# Patient Record
Sex: FEMALE | Race: WHITE | URBAN - METROPOLITAN AREA
[De-identification: names, ages, dates, MRNs, and addresses within clinical notes are randomized per-mention and may not be internally consistent; named-entity substitution may affect disease eponyms.]

---

## 2020-09-09 ENCOUNTER — OFFICE VISIT (OUTPATIENT)
Dept: URBAN - METROPOLITAN AREA CLINIC 45 | Facility: CLINIC | Age: 45
End: 2020-09-09
Payer: COMMERCIAL

## 2020-09-09 DIAGNOSIS — H00.011 HORDEOLUM EXTERNUM RIGHT UPPER EYELID: Primary | ICD-10-CM

## 2020-09-09 PROCEDURE — 92002 INTRM OPH EXAM NEW PATIENT: CPT | Performed by: OPTOMETRIST

## 2020-09-09 RX ORDER — NEOMYCIN SULFATE, POLYMYXIN B SULFATE AND DEXAMETHASONE 3.5; 10000; 1 MG/G; [USP'U]/G; MG/G
OINTMENT OPHTHALMIC
Qty: 1 | Refills: 0 | Status: INACTIVE
Start: 2020-09-09 | End: 2020-10-08

## 2020-09-09 ASSESSMENT — INTRAOCULAR PRESSURE
OS: 12
OD: 13

## 2020-09-09 NOTE — IMPRESSION/PLAN
Impression: Hordeolum externum right upper eyelid: H00.011. Plan: expressed lesion at slip lamp.  Start warm comps, maxitrol qid RUL

## 2020-09-23 ENCOUNTER — OFFICE VISIT (OUTPATIENT)
Dept: URBAN - METROPOLITAN AREA CLINIC 45 | Facility: CLINIC | Age: 45
End: 2020-09-23
Payer: COMMERCIAL

## 2020-09-23 DIAGNOSIS — H52.13 MYOPIA, BILATERAL: ICD-10-CM

## 2020-09-23 DIAGNOSIS — H52.4 PRESBYOPIA: Primary | ICD-10-CM

## 2020-09-23 PROCEDURE — 92012 INTRM OPH EXAM EST PATIENT: CPT | Performed by: OPTOMETRIST

## 2020-09-23 PROCEDURE — 92310 CONTACT LENS FITTING OU: CPT | Performed by: OPTOMETRIST

## 2020-09-23 ASSESSMENT — KERATOMETRY
OS: 41.13
OD: 40.63

## 2020-10-22 ENCOUNTER — TESTING ONLY (OUTPATIENT)
Dept: URBAN - METROPOLITAN AREA CLINIC 45 | Facility: CLINIC | Age: 45
End: 2020-10-22

## 2020-10-22 PROCEDURE — 92310 CONTACT LENS FITTING OU: CPT | Performed by: OPTOMETRIST

## 2024-06-24 ENCOUNTER — OFFICE VISIT (OUTPATIENT)
Dept: URBAN - METROPOLITAN AREA CLINIC 32 | Facility: CLINIC | Age: 49
End: 2024-06-24
Payer: COMMERCIAL

## 2024-06-24 DIAGNOSIS — H04.123 DRY EYE SYNDROME: ICD-10-CM

## 2024-06-24 DIAGNOSIS — H52.4 PRESBYOPIA: Primary | ICD-10-CM

## 2024-06-24 PROCEDURE — 92004 COMPRE OPH EXAM NEW PT 1/>: CPT | Performed by: OPTOMETRIST

## 2024-06-24 ASSESSMENT — VISUAL ACUITY
OS: 20/20
OD: 20/20

## 2024-06-24 ASSESSMENT — INTRAOCULAR PRESSURE
OD: 16
OS: 16